# Patient Record
Sex: MALE | Race: WHITE | NOT HISPANIC OR LATINO | URBAN - METROPOLITAN AREA
[De-identification: names, ages, dates, MRNs, and addresses within clinical notes are randomized per-mention and may not be internally consistent; named-entity substitution may affect disease eponyms.]

---

## 2021-05-26 ENCOUNTER — OFFICE (OUTPATIENT)
Dept: URBAN - METROPOLITAN AREA CLINIC 11 | Facility: CLINIC | Age: 76
End: 2021-05-26

## 2021-05-26 VITALS
SYSTOLIC BLOOD PRESSURE: 141 MMHG | HEIGHT: 69 IN | HEART RATE: 71 BPM | WEIGHT: 200 LBS | DIASTOLIC BLOOD PRESSURE: 71 MMHG | OXYGEN SATURATION: 98 %

## 2021-05-26 DIAGNOSIS — I48.91 UNSPECIFIED ATRIAL FIBRILLATION: ICD-10-CM

## 2021-05-26 DIAGNOSIS — Z79.01 LONG TERM (CURRENT) USE OF ANTICOAGULANTS: ICD-10-CM

## 2021-05-26 DIAGNOSIS — Z12.11 ENCOUNTER FOR SCREENING FOR MALIGNANT NEOPLASM OF COLON: ICD-10-CM

## 2021-05-26 DIAGNOSIS — Z86.010 PERSONAL HISTORY OF COLONIC POLYPS: ICD-10-CM

## 2021-05-26 RX ORDER — POLYETHYLENE GLYCOL 3350, SODIUM SULFATE, SODIUM CHLORIDE, POTASSIUM CHLORIDE, ASCORBIC ACID, SODIUM ASCORBATE 140-9-5.2G
KIT ORAL
Qty: 1 | Refills: 0 | Status: COMPLETED
Start: 2021-05-26 | End: 2022-02-10

## 2021-05-26 NOTE — SERVICEHPINOTES
Mr. Rangel is a 75-year-old man here for follow-up for colon polyp surveillance. The patient was initially seen by me in 2014 for colon cancer screening as well as some reflux and mild dysphagia. He underwent EGD and colonoscopy by me in January 2015 where EGD revealed a mildly tortuous esophagus. He underwent empiric Savary dilation to 19 mm. There was some erythema at the GE junction which was consistent with gastric cardia type mucosa and no evidence of Diallo's esophagus. He did have around 12 or 13 adenomatous polyps removed at that time. Because of this he underwent repeat colonoscopy in February 2016 where only one adenomatous polyp was removed. He is now overdue for his next colonoscopy. The patient states that he has had some issues with atrial fibrillation and is currently on Eliquis because of this. 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 He did have an episode of atrial tachycardia last year and underwent ablation because of this and has not had any issues since then. He is followed from a cardiology standpoint by Dr. Mosley. He states that he also had a fall last year and has undergone surgeries. He is currently undergoing evaluation for possible knee surgery which he will know about in the next couple of weeks. He also is undergoing evaluation for sleep apnea. The patient denies any fevers, chills, nausea, vomiting, abdominal pain, diarrhea, constipation, or rectal bleeding. He states that he would like to hold off on his colonoscopy until his knee evaluation and sleep apnea evaluation or complete.

## 2021-05-26 NOTE — SERVICENOTES
The patient would like to hold off on his colonoscopy evaluation until his knee evaluation and sleep apnea evaluation or complete.  He initially wanted to wait six months.  I recommended against this, but stated that it would be reasonable for him to wait a couple of months so that we can see if there are any issues that come up in regard to his knee or sleep apnea.  He does understand the risks of postponing colonoscopy.

## 2022-02-10 ENCOUNTER — AMBULATORY SURGICAL CENTER (OUTPATIENT)
Dept: URBAN - METROPOLITAN AREA SURGERY 2 | Facility: SURGERY | Age: 77
End: 2022-02-10

## 2022-02-10 ENCOUNTER — OFFICE (OUTPATIENT)
Dept: URBAN - METROPOLITAN AREA PATHOLOGY 22 | Facility: PATHOLOGY | Age: 77
End: 2022-02-10

## 2022-02-10 ENCOUNTER — AMBULATORY SURGICAL CENTER (OUTPATIENT)
Dept: URBAN - METROPOLITAN AREA SURGERY 2 | Facility: SURGERY | Age: 77
End: 2022-02-10
Payer: COMMERCIAL

## 2022-02-10 VITALS
HEART RATE: 71 BPM | TEMPERATURE: 98.2 F | DIASTOLIC BLOOD PRESSURE: 62 MMHG | HEART RATE: 64 BPM | OXYGEN SATURATION: 95 % | WEIGHT: 183 LBS | DIASTOLIC BLOOD PRESSURE: 51 MMHG | OXYGEN SATURATION: 94 % | HEART RATE: 67 BPM | WEIGHT: 183 LBS | RESPIRATION RATE: 18 BRPM | TEMPERATURE: 98.2 F | DIASTOLIC BLOOD PRESSURE: 51 MMHG | DIASTOLIC BLOOD PRESSURE: 46 MMHG | WEIGHT: 183 LBS | DIASTOLIC BLOOD PRESSURE: 62 MMHG | HEIGHT: 69 IN | SYSTOLIC BLOOD PRESSURE: 132 MMHG | HEART RATE: 64 BPM | DIASTOLIC BLOOD PRESSURE: 46 MMHG | SYSTOLIC BLOOD PRESSURE: 109 MMHG | SYSTOLIC BLOOD PRESSURE: 119 MMHG | HEIGHT: 69 IN | DIASTOLIC BLOOD PRESSURE: 62 MMHG | OXYGEN SATURATION: 96 % | SYSTOLIC BLOOD PRESSURE: 135 MMHG | HEART RATE: 73 BPM | RESPIRATION RATE: 18 BRPM | OXYGEN SATURATION: 93 % | HEART RATE: 71 BPM | OXYGEN SATURATION: 93 % | RESPIRATION RATE: 18 BRPM | OXYGEN SATURATION: 93 % | HEART RATE: 67 BPM | OXYGEN SATURATION: 96 % | HEART RATE: 71 BPM | RESPIRATION RATE: 16 BRPM | SYSTOLIC BLOOD PRESSURE: 132 MMHG | DIASTOLIC BLOOD PRESSURE: 76 MMHG | DIASTOLIC BLOOD PRESSURE: 76 MMHG | TEMPERATURE: 98.2 F | OXYGEN SATURATION: 96 % | DIASTOLIC BLOOD PRESSURE: 46 MMHG | SYSTOLIC BLOOD PRESSURE: 119 MMHG | HEIGHT: 69 IN | OXYGEN SATURATION: 95 % | HEART RATE: 64 BPM | HEART RATE: 73 BPM | SYSTOLIC BLOOD PRESSURE: 135 MMHG | SYSTOLIC BLOOD PRESSURE: 109 MMHG | SYSTOLIC BLOOD PRESSURE: 132 MMHG | SYSTOLIC BLOOD PRESSURE: 135 MMHG | SYSTOLIC BLOOD PRESSURE: 109 MMHG | DIASTOLIC BLOOD PRESSURE: 76 MMHG | RESPIRATION RATE: 16 BRPM | OXYGEN SATURATION: 95 % | DIASTOLIC BLOOD PRESSURE: 51 MMHG | OXYGEN SATURATION: 94 % | HEART RATE: 67 BPM | SYSTOLIC BLOOD PRESSURE: 119 MMHG | HEART RATE: 73 BPM | RESPIRATION RATE: 16 BRPM | OXYGEN SATURATION: 94 %

## 2022-02-10 DIAGNOSIS — K51.40 INFLAMMATORY POLYPS OF COLON WITHOUT COMPLICATIONS: ICD-10-CM

## 2022-02-10 DIAGNOSIS — Z86.010 PERSONAL HISTORY OF COLONIC POLYPS: ICD-10-CM

## 2022-02-10 DIAGNOSIS — K57.30 DIVERTICULOSIS OF LARGE INTESTINE WITHOUT PERFORATION OR ABS: ICD-10-CM

## 2022-02-10 DIAGNOSIS — K64.1 SECOND DEGREE HEMORRHOIDS: ICD-10-CM

## 2022-02-10 PROBLEM — K63.5 POLYP OF COLON: Status: ACTIVE | Noted: 2022-02-10

## 2022-02-10 PROCEDURE — 45385 COLONOSCOPY W/LESION REMOVAL: CPT | Mod: PT | Performed by: INTERNAL MEDICINE

## 2022-02-10 PROCEDURE — G8918 PT W/O PREOP ORDER IV AB PRO: HCPCS | Performed by: INTERNAL MEDICINE

## 2022-02-10 PROCEDURE — 88305 TISSUE EXAM BY PATHOLOGIST: CPT | Performed by: PATHOLOGY
